# Patient Record
Sex: FEMALE | Race: OTHER | HISPANIC OR LATINO | URBAN - METROPOLITAN AREA
[De-identification: names, ages, dates, MRNs, and addresses within clinical notes are randomized per-mention and may not be internally consistent; named-entity substitution may affect disease eponyms.]

---

## 2021-11-18 ENCOUNTER — OFFICE (OUTPATIENT)
Dept: URBAN - METROPOLITAN AREA CLINIC 10 | Facility: CLINIC | Age: 41
End: 2021-11-18

## 2021-11-18 VITALS
WEIGHT: 198 LBS | DIASTOLIC BLOOD PRESSURE: 91 MMHG | SYSTOLIC BLOOD PRESSURE: 137 MMHG | HEART RATE: 104 BPM | OXYGEN SATURATION: 100 % | HEIGHT: 62 IN

## 2021-11-18 DIAGNOSIS — K60.2 ANAL FISSURE, UNSPECIFIED: ICD-10-CM

## 2021-11-18 DIAGNOSIS — K62.89 OTHER SPECIFIED DISEASES OF ANUS AND RECTUM: ICD-10-CM

## 2021-11-18 PROCEDURE — 99203 OFFICE O/P NEW LOW 30 MIN: CPT | Performed by: INTERNAL MEDICINE

## 2022-08-10 ENCOUNTER — OFFICE (OUTPATIENT)
Dept: URBAN - METROPOLITAN AREA CLINIC 19 | Facility: CLINIC | Age: 42
End: 2022-08-10

## 2022-08-10 VITALS
WEIGHT: 208 LBS | SYSTOLIC BLOOD PRESSURE: 108 MMHG | HEIGHT: 62 IN | HEART RATE: 64 BPM | DIASTOLIC BLOOD PRESSURE: 62 MMHG | OXYGEN SATURATION: 100 %

## 2022-08-10 DIAGNOSIS — R63.5 ABNORMAL WEIGHT GAIN: ICD-10-CM

## 2022-08-10 DIAGNOSIS — R53.83 OTHER FATIGUE: ICD-10-CM

## 2022-08-10 DIAGNOSIS — R11.2 NAUSEA WITH VOMITING, UNSPECIFIED: ICD-10-CM

## 2022-08-10 DIAGNOSIS — R73.03 PREDIABETES: ICD-10-CM

## 2022-08-10 DIAGNOSIS — D76.3 OTHER HISTIOCYTOSIS SYNDROMES: ICD-10-CM

## 2022-08-10 DIAGNOSIS — R10.816 EPIGASTRIC ABDOMINAL TENDERNESS: ICD-10-CM

## 2022-08-10 DIAGNOSIS — R10.10 UPPER ABDOMINAL PAIN, UNSPECIFIED: ICD-10-CM

## 2022-08-10 DIAGNOSIS — K59.00 CONSTIPATION, UNSPECIFIED: ICD-10-CM

## 2022-08-10 DIAGNOSIS — R63.1 POLYDIPSIA: ICD-10-CM

## 2022-08-10 PROCEDURE — 99214 OFFICE O/P EST MOD 30 MIN: CPT | Performed by: INTERNAL MEDICINE

## 2022-08-10 RX ORDER — OMEPRAZOLE 20 MG/1
20 CAPSULE, DELAYED RELEASE ORAL
Qty: 30 | Refills: 11 | Status: COMPLETED
Start: 2022-08-10 | End: 2022-11-10

## 2022-11-10 ENCOUNTER — OFFICE (OUTPATIENT)
Dept: URBAN - METROPOLITAN AREA CLINIC 19 | Facility: CLINIC | Age: 42
End: 2022-11-10

## 2022-11-10 VITALS
DIASTOLIC BLOOD PRESSURE: 79 MMHG | SYSTOLIC BLOOD PRESSURE: 123 MMHG | HEART RATE: 71 BPM | HEIGHT: 62 IN | WEIGHT: 218 LBS

## 2022-11-10 DIAGNOSIS — D76.3 OTHER HISTIOCYTOSIS SYNDROMES: ICD-10-CM

## 2022-11-10 DIAGNOSIS — R10.10 UPPER ABDOMINAL PAIN, UNSPECIFIED: ICD-10-CM

## 2022-11-10 DIAGNOSIS — Z22.7 LATENT TUBERCULOSIS: ICD-10-CM

## 2022-11-10 DIAGNOSIS — R10.816 EPIGASTRIC ABDOMINAL TENDERNESS: ICD-10-CM

## 2022-11-10 DIAGNOSIS — R74.01 ELEVATION OF LEVELS OF LIVER TRANSAMINASE LEVELS: ICD-10-CM

## 2022-11-10 DIAGNOSIS — K59.00 CONSTIPATION, UNSPECIFIED: ICD-10-CM

## 2022-11-10 DIAGNOSIS — R11.2 NAUSEA WITH VOMITING, UNSPECIFIED: ICD-10-CM

## 2022-11-10 LAB
ALPHA-1-ANTITRYPSIN PHENOTYP: ALPHA-1-ANTITRYPSIN, SERUM: 150 MG/DL (ref 101–187)
ALPHA-1-ANTITRYPSIN PHENOTYP: PHENOTYPE (PI): (no result)
CERULOPLASMIN: 26.4 MG/DL (ref 19–39)
HBSAG SCREEN: NEGATIVE
HEPATIC FUNCTION PANEL (7): ALBUMIN: 4.2 G/DL (ref 3.8–4.8)
HEPATIC FUNCTION PANEL (7): ALKALINE PHOSPHATASE: 108 IU/L (ref 44–121)
HEPATIC FUNCTION PANEL (7): ALT (SGPT): 47 IU/L — HIGH (ref 0–32)
HEPATIC FUNCTION PANEL (7): AST (SGOT): 66 IU/L — HIGH (ref 0–40)
HEPATIC FUNCTION PANEL (7): BILIRUBIN, DIRECT: <0.1 MG/DL
HEPATIC FUNCTION PANEL (7): BILIRUBIN, TOTAL: 0.3 MG/DL (ref 0–1.2)
HEPATIC FUNCTION PANEL (7): PROTEIN, TOTAL: 7.7 G/DL (ref 6–8.5)

## 2022-11-10 PROCEDURE — 99214 OFFICE O/P EST MOD 30 MIN: CPT | Performed by: INTERNAL MEDICINE

## 2022-11-10 RX ORDER — LANSOPRAZOLE 30 MG/1
CAPSULE, DELAYED RELEASE PELLETS ORAL
Qty: 30 | Refills: 6 | Status: COMPLETED
Start: 2022-11-10 | End: 2023-11-14

## 2022-11-10 RX ORDER — LINACLOTIDE 145 UG/1
CAPSULE, GELATIN COATED ORAL
Qty: 30 | Refills: 3 | Status: COMPLETED
Start: 2022-11-10 | End: 2023-11-14

## 2022-12-06 ENCOUNTER — AMBULATORY SURGICAL CENTER (OUTPATIENT)
Dept: URBAN - METROPOLITAN AREA SURGERY 2 | Facility: SURGERY | Age: 42
End: 2022-12-06

## 2022-12-06 ENCOUNTER — OFFICE (OUTPATIENT)
Dept: URBAN - METROPOLITAN AREA PATHOLOGY 22 | Facility: PATHOLOGY | Age: 42
End: 2022-12-06
Payer: COMMERCIAL

## 2022-12-06 VITALS
DIASTOLIC BLOOD PRESSURE: 84 MMHG | DIASTOLIC BLOOD PRESSURE: 82 MMHG | SYSTOLIC BLOOD PRESSURE: 132 MMHG | RESPIRATION RATE: 18 BRPM | SYSTOLIC BLOOD PRESSURE: 141 MMHG | DIASTOLIC BLOOD PRESSURE: 83 MMHG | WEIGHT: 219.4 LBS | RESPIRATION RATE: 16 BRPM | OXYGEN SATURATION: 94 % | DIASTOLIC BLOOD PRESSURE: 88 MMHG | HEART RATE: 74 BPM | HEART RATE: 82 BPM | HEART RATE: 88 BPM | TEMPERATURE: 98.3 F | SYSTOLIC BLOOD PRESSURE: 132 MMHG | OXYGEN SATURATION: 95 % | SYSTOLIC BLOOD PRESSURE: 122 MMHG | DIASTOLIC BLOOD PRESSURE: 83 MMHG | HEART RATE: 88 BPM | SYSTOLIC BLOOD PRESSURE: 126 MMHG | DIASTOLIC BLOOD PRESSURE: 88 MMHG | RESPIRATION RATE: 16 BRPM | HEART RATE: 96 BPM | RESPIRATION RATE: 18 BRPM | DIASTOLIC BLOOD PRESSURE: 84 MMHG | OXYGEN SATURATION: 95 % | SYSTOLIC BLOOD PRESSURE: 125 MMHG | SYSTOLIC BLOOD PRESSURE: 126 MMHG | HEIGHT: 62 IN | DIASTOLIC BLOOD PRESSURE: 87 MMHG | HEART RATE: 84 BPM | RESPIRATION RATE: 20 BRPM | HEART RATE: 74 BPM | SYSTOLIC BLOOD PRESSURE: 126 MMHG | SYSTOLIC BLOOD PRESSURE: 138 MMHG | SYSTOLIC BLOOD PRESSURE: 141 MMHG | HEIGHT: 62 IN | SYSTOLIC BLOOD PRESSURE: 132 MMHG | OXYGEN SATURATION: 92 % | HEART RATE: 74 BPM | OXYGEN SATURATION: 92 % | HEART RATE: 84 BPM | SYSTOLIC BLOOD PRESSURE: 138 MMHG | HEART RATE: 96 BPM | DIASTOLIC BLOOD PRESSURE: 83 MMHG | TEMPERATURE: 98.2 F | OXYGEN SATURATION: 92 % | TEMPERATURE: 98.2 F | WEIGHT: 219.4 LBS | RESPIRATION RATE: 20 BRPM | DIASTOLIC BLOOD PRESSURE: 87 MMHG | TEMPERATURE: 98.3 F | SYSTOLIC BLOOD PRESSURE: 125 MMHG | DIASTOLIC BLOOD PRESSURE: 79 MMHG | RESPIRATION RATE: 18 BRPM | SYSTOLIC BLOOD PRESSURE: 122 MMHG | DIASTOLIC BLOOD PRESSURE: 79 MMHG | OXYGEN SATURATION: 95 % | DIASTOLIC BLOOD PRESSURE: 79 MMHG | HEART RATE: 96 BPM | DIASTOLIC BLOOD PRESSURE: 82 MMHG | DIASTOLIC BLOOD PRESSURE: 87 MMHG | WEIGHT: 219.4 LBS | DIASTOLIC BLOOD PRESSURE: 82 MMHG | DIASTOLIC BLOOD PRESSURE: 84 MMHG | SYSTOLIC BLOOD PRESSURE: 141 MMHG | HEIGHT: 62 IN | SYSTOLIC BLOOD PRESSURE: 122 MMHG | RESPIRATION RATE: 16 BRPM | HEART RATE: 88 BPM | OXYGEN SATURATION: 94 % | TEMPERATURE: 98.3 F | OXYGEN SATURATION: 94 % | SYSTOLIC BLOOD PRESSURE: 138 MMHG | SYSTOLIC BLOOD PRESSURE: 125 MMHG | HEART RATE: 82 BPM | TEMPERATURE: 98.2 F | DIASTOLIC BLOOD PRESSURE: 88 MMHG | HEART RATE: 84 BPM | RESPIRATION RATE: 20 BRPM | HEART RATE: 82 BPM

## 2022-12-06 DIAGNOSIS — K44.9 DIAPHRAGMATIC HERNIA WITHOUT OBSTRUCTION OR GANGRENE: ICD-10-CM

## 2022-12-06 DIAGNOSIS — K29.50 UNSPECIFIED CHRONIC GASTRITIS WITHOUT BLEEDING: ICD-10-CM

## 2022-12-06 DIAGNOSIS — K20.90 ESOPHAGITIS, UNSPECIFIED WITHOUT BLEEDING: ICD-10-CM

## 2022-12-06 DIAGNOSIS — R11.2 NAUSEA WITH VOMITING, UNSPECIFIED: ICD-10-CM

## 2022-12-06 DIAGNOSIS — B37.81 CANDIDAL ESOPHAGITIS: ICD-10-CM

## 2022-12-06 DIAGNOSIS — R10.816 EPIGASTRIC ABDOMINAL TENDERNESS: ICD-10-CM

## 2022-12-06 PROBLEM — K31.89 OTHER DISEASES OF STOMACH AND DUODENUM: Status: ACTIVE | Noted: 2022-12-06

## 2022-12-06 PROCEDURE — 88342 IMHCHEM/IMCYTCHM 1ST ANTB: CPT | Performed by: STUDENT IN AN ORGANIZED HEALTH CARE EDUCATION/TRAINING PROGRAM

## 2022-12-06 PROCEDURE — 88312 SPECIAL STAINS GROUP 1: CPT | Performed by: STUDENT IN AN ORGANIZED HEALTH CARE EDUCATION/TRAINING PROGRAM

## 2022-12-06 PROCEDURE — 43239 EGD BIOPSY SINGLE/MULTIPLE: CPT | Performed by: INTERNAL MEDICINE

## 2022-12-06 PROCEDURE — 88313 SPECIAL STAINS GROUP 2: CPT | Performed by: STUDENT IN AN ORGANIZED HEALTH CARE EDUCATION/TRAINING PROGRAM

## 2022-12-06 PROCEDURE — 88305 TISSUE EXAM BY PATHOLOGIST: CPT | Performed by: STUDENT IN AN ORGANIZED HEALTH CARE EDUCATION/TRAINING PROGRAM

## 2023-11-14 ENCOUNTER — OFFICE (OUTPATIENT)
Dept: URBAN - METROPOLITAN AREA CLINIC 19 | Facility: CLINIC | Age: 43
End: 2023-11-14
Payer: COMMERCIAL

## 2023-11-14 VITALS
HEART RATE: 71 BPM | SYSTOLIC BLOOD PRESSURE: 117 MMHG | OXYGEN SATURATION: 97 % | HEIGHT: 62 IN | DIASTOLIC BLOOD PRESSURE: 77 MMHG | WEIGHT: 229 LBS

## 2023-11-14 DIAGNOSIS — R10.816 EPIGASTRIC ABDOMINAL TENDERNESS: ICD-10-CM

## 2023-11-14 DIAGNOSIS — R11.2 NAUSEA WITH VOMITING, UNSPECIFIED: ICD-10-CM

## 2023-11-14 DIAGNOSIS — D76.3 OTHER HISTIOCYTOSIS SYNDROMES: ICD-10-CM

## 2023-11-14 DIAGNOSIS — R63.5 ABNORMAL WEIGHT GAIN: ICD-10-CM

## 2023-11-14 DIAGNOSIS — R74.01 ELEVATION OF LEVELS OF LIVER TRANSAMINASE LEVELS: ICD-10-CM

## 2023-11-14 DIAGNOSIS — Z22.7 LATENT TUBERCULOSIS: ICD-10-CM

## 2023-11-14 PROCEDURE — 99214 OFFICE O/P EST MOD 30 MIN: CPT

## 2023-11-14 RX ORDER — METOCLOPRAMIDE 5 MG/1
TABLET ORAL
Qty: 90 | Refills: 0 | Status: ACTIVE
Start: 2023-11-14

## 2023-11-14 RX ORDER — OMEPRAZOLE 40 MG/1
CAPSULE, DELAYED RELEASE ORAL
Qty: 180 | Refills: 1 | Status: ACTIVE
Start: 2023-11-14

## 2023-11-14 NOTE — SERVICEHPINOTES
43-year-old female here for a follow-up visit.  Continues to report discomfort in the epigastric region as well as nausea which limits her ability to eat. Appears to have gained 11 pounds since her last clinic visit in November 2022. Spoke to patient's daughter and advised her to get in touch with the health department regarding starting treatment for latent TB. Not taking any PPI therapy.  Quit taking Linzess and is complaining of constipation as well as a sense of incomplete evacuation. She does have history of pre diabetes.  She does have some skin lesions and reports that biopsies revealed evidence of reticulohistiocytoma.  She was prescribed methotrexate but could not tolerate this.  No family history of colon cancer or colon polyps in first-degree relatives.  No cardiac issues and not on any blood thinners or diet pills.  She is post cholecystectomy.  Outside labs from September 2023 revealed , ALT 70, alkaline phosphatase 80. A1c 5.4. Takes Ozempic. Her family member served as the  during the visit per patient request.   Informed me that she is seeing Dr. Girard regarding the reticulohistiocytoma and is scheduled for a PET scan tomorrow at [facility].  Also informed me that she is being referred to another GI doctor and is planning to undergo a colonoscopy at [facility].carina lim Evaluated in November 2022 for nausea and vomiting reveals elevated liver enzymes.  Labs at that time revealed AST 66 and ALT 47.  Alpha 1 antitrypsin phenotype mm.  Normal ceruloplasmin. Hepatitis-B surface antigen negative.  Normal gastric emptying test.  EGD in December 2022 revealed a few white plaques in the middle esophagus, small hiatal hernia, erythema in the antrum/stomach body, and normal mucosa in the 1st and 2nd part of the duodenum.  Biopsies from the esophagus revealed Candida esophagitis and negative for H pylori.  Patient was treated with a course of Diflucan. carina lim Evaluated in August 2022 for abdominal pain, distention and fatigue.  Celiac serology was negative.  AST was 56, ALT 53.  Rest of the liver enzymes were normal.  CBC was normal.  Alpha gal IgE antibody was negative.  Iron saturation was 21.  B12 553 and folate 18.  TSH and vitamin-D were normal.  Amylase and lipase were normal.  CRP was normal.  Ferritin was 14.  Food allergy panel was negative.  DEMETRIO was elevated in 1:80 titer.  Hemoglobin A1c was 6.2.  Ultrasound in August 2022 revealed hepatic steatosis, prior cholecystectomy status.  There was no biliary dilation.  CT abdomen and pelvis with contrast revealed normal liver, pancreas.  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  Labs done at their office revealed AST 82, ALT 69.  Hep B core IgM antibody was negative.  Hep C antibody was negative.  QuantiFERON was positive.

## 2023-11-14 NOTE — SERVICENOTES
MD Addendum: I, Solange Frias MD, independently interviewed and examined this patient and made modifications to the final HPI, exam, impression, and plan as initially scribed by Irais Franks NP.

## 2023-11-17 LAB
ALPHA-GAL IGE PANEL: CLASS DESCRIPTION: (no result)
ALPHA-GAL IGE PANEL: F026-IGE PORK: <0.1 KU/L
ALPHA-GAL IGE PANEL: F027-IGE BEEF: <0.1 KU/L
ALPHA-GAL IGE PANEL: F088-IGE LAMB: <0.1 KU/L
ALPHA-GAL IGE PANEL: IMMUNOGLOBULIN E, TOTAL: 152 IU/ML (ref 6–495)
ALPHA-GAL IGE PANEL: O215-IGE ALPHA-GAL: <0.1 KU/L
CELIAC DISEASE COMPREHENSIVE: DEAMIDATED GLIADIN ABS, IGA: 4 UNITS (ref 0–19)
CELIAC DISEASE COMPREHENSIVE: DEAMIDATED GLIADIN ABS, IGG: 3 UNITS (ref 0–19)
CELIAC DISEASE COMPREHENSIVE: ENDOMYSIAL ANTIBODY IGA: NEGATIVE
CELIAC DISEASE COMPREHENSIVE: IMMUNOGLOBULIN A, QN, SERUM: 281 MG/DL (ref 87–352)
CELIAC DISEASE COMPREHENSIVE: T-TRANSGLUTAMINASE (TTG) IGA: <2 U/ML
CELIAC DISEASE COMPREHENSIVE: T-TRANSGLUTAMINASE (TTG) IGG: 2 U/ML (ref 0–5)
FOOD ALLERGY PROFILE: F001-IGE EGG WHITE: <0.1 KU/L
FOOD ALLERGY PROFILE: F002-IGE MILK: <0.1 KU/L
FOOD ALLERGY PROFILE: F003-IGE CODFISH: <0.1 KU/L
FOOD ALLERGY PROFILE: F004-IGE WHEAT: <0.1 KU/L
FOOD ALLERGY PROFILE: F008-IGE CORN: <0.1 KU/L
FOOD ALLERGY PROFILE: F010-IGE SESAME SEED: <0.1 KU/L
FOOD ALLERGY PROFILE: F013-IGE PEANUT: <0.1 KU/L
FOOD ALLERGY PROFILE: F014-IGE SOYBEAN: <0.1 KU/L
FOOD ALLERGY PROFILE: F024-IGE SHRIMP: <0.1 KU/L
FOOD ALLERGY PROFILE: F207-IGE CLAM: <0.1 KU/L
FOOD ALLERGY PROFILE: F256-IGE WALNUT: <0.1 KU/L
FOOD ALLERGY PROFILE: F338-IGE SCALLOP: <0.1 KU/L

## 2024-01-23 ENCOUNTER — OFFICE (OUTPATIENT)
Dept: URBAN - METROPOLITAN AREA CLINIC 11 | Facility: CLINIC | Age: 44
End: 2024-01-23
Payer: COMMERCIAL

## 2024-01-23 VITALS — HEIGHT: 62 IN

## 2024-01-23 DIAGNOSIS — K76.0 FATTY (CHANGE OF) LIVER, NOT ELSEWHERE CLASSIFIED: ICD-10-CM

## 2024-01-23 PROCEDURE — 76981 USE PARENCHYMA: CPT | Performed by: INTERNAL MEDICINE

## 2025-03-28 ENCOUNTER — OFFICE (OUTPATIENT)
Dept: URBAN - METROPOLITAN AREA CLINIC 10 | Facility: CLINIC | Age: 45
End: 2025-03-28

## 2025-03-28 VITALS
DIASTOLIC BLOOD PRESSURE: 74 MMHG | HEART RATE: 85 BPM | SYSTOLIC BLOOD PRESSURE: 113 MMHG | WEIGHT: 211 LBS | HEIGHT: 62 IN | OXYGEN SATURATION: 97 %

## 2025-03-28 DIAGNOSIS — R10.816 EPIGASTRIC ABDOMINAL TENDERNESS: ICD-10-CM

## 2025-03-28 DIAGNOSIS — Z22.7 LATENT TUBERCULOSIS: ICD-10-CM

## 2025-03-28 DIAGNOSIS — K21.9 GASTRO-ESOPHAGEAL REFLUX DISEASE WITHOUT ESOPHAGITIS: ICD-10-CM

## 2025-03-28 DIAGNOSIS — R63.0 ANOREXIA: ICD-10-CM

## 2025-03-28 DIAGNOSIS — K74.00 HEPATIC FIBROSIS, UNSPECIFIED: ICD-10-CM

## 2025-03-28 PROCEDURE — 99214 OFFICE O/P EST MOD 30 MIN: CPT

## 2025-03-28 RX ORDER — ONDANSETRON 4 MG/1
TABLET, ORALLY DISINTEGRATING ORAL
Qty: 20 | Refills: 0 | Status: ACTIVE
Start: 2025-03-28

## 2025-03-28 RX ORDER — ESOMEPRAZOLE MAGNESIUM 40 MG/1
CAPSULE, DELAYED RELEASE PELLETS ORAL
Qty: 90 | Refills: 3 | Status: ACTIVE
Start: 2025-03-28

## 2025-03-31 LAB
CBC WITH DIFFERENTIAL/PLATELET: BASO (ABSOLUTE): 0.1 X10E3/UL (ref 0–0.2)
CBC WITH DIFFERENTIAL/PLATELET: BASOS: 1 %
CBC WITH DIFFERENTIAL/PLATELET: EOS (ABSOLUTE): 0.6 X10E3/UL — HIGH (ref 0–0.4)
CBC WITH DIFFERENTIAL/PLATELET: EOS: 8 %
CBC WITH DIFFERENTIAL/PLATELET: HEMATOCRIT: 47.4 % — HIGH (ref 34–46.6)
CBC WITH DIFFERENTIAL/PLATELET: HEMOGLOBIN: 15.7 G/DL (ref 11.1–15.9)
CBC WITH DIFFERENTIAL/PLATELET: IMMATURE GRANS (ABS): 0 X10E3/UL (ref 0–0.1)
CBC WITH DIFFERENTIAL/PLATELET: IMMATURE GRANULOCYTES: 0 %
CBC WITH DIFFERENTIAL/PLATELET: LYMPHS (ABSOLUTE): 3.3 X10E3/UL — HIGH (ref 0.7–3.1)
CBC WITH DIFFERENTIAL/PLATELET: LYMPHS: 39 %
CBC WITH DIFFERENTIAL/PLATELET: MCH: 29.1 PG (ref 26.6–33)
CBC WITH DIFFERENTIAL/PLATELET: MCHC: 33.1 G/DL (ref 31.5–35.7)
CBC WITH DIFFERENTIAL/PLATELET: MCV: 88 FL (ref 79–97)
CBC WITH DIFFERENTIAL/PLATELET: MONOCYTES(ABSOLUTE): 0.6 X10E3/UL (ref 0.1–0.9)
CBC WITH DIFFERENTIAL/PLATELET: MONOCYTES: 7 %
CBC WITH DIFFERENTIAL/PLATELET: NEUTROPHILS (ABSOLUTE): 3.9 X10E3/UL (ref 1.4–7)
CBC WITH DIFFERENTIAL/PLATELET: NEUTROPHILS: 45 %
CBC WITH DIFFERENTIAL/PLATELET: PLATELETS: 272 X10E3/UL (ref 150–450)
CBC WITH DIFFERENTIAL/PLATELET: RBC: 5.4 X10E6/UL — HIGH (ref 3.77–5.28)
CBC WITH DIFFERENTIAL/PLATELET: RDW: 14.4 % (ref 11.7–15.4)
CBC WITH DIFFERENTIAL/PLATELET: WBC: 8.5 X10E3/UL (ref 3.4–10.8)
COMP. METABOLIC PANEL (14): ALBUMIN: 4.9 G/DL (ref 3.9–4.9)
COMP. METABOLIC PANEL (14): ALKALINE PHOSPHATASE: 73 IU/L (ref 44–121)
COMP. METABOLIC PANEL (14): ALT (SGPT): 34 IU/L — HIGH (ref 0–32)
COMP. METABOLIC PANEL (14): AST (SGOT): 39 IU/L (ref 0–40)
COMP. METABOLIC PANEL (14): BILIRUBIN, TOTAL: 0.8 MG/DL (ref 0–1.2)
COMP. METABOLIC PANEL (14): BUN/CREATININE RATIO: 7 — LOW (ref 9–23)
COMP. METABOLIC PANEL (14): BUN: 6 MG/DL (ref 6–24)
COMP. METABOLIC PANEL (14): CALCIUM: 10.3 MG/DL — HIGH (ref 8.7–10.2)
COMP. METABOLIC PANEL (14): CARBON DIOXIDE, TOTAL: 23 MMOL/L (ref 20–29)
COMP. METABOLIC PANEL (14): CHLORIDE: 102 MMOL/L (ref 96–106)
COMP. METABOLIC PANEL (14): CREATININE: 0.87 MG/DL (ref 0.57–1)
COMP. METABOLIC PANEL (14): EGFR: 85 ML/MIN/1.73 (ref 59–?)
COMP. METABOLIC PANEL (14): GLOBULIN, TOTAL: 2.9 G/DL (ref 1.5–4.5)
COMP. METABOLIC PANEL (14): GLUCOSE: 87 MG/DL (ref 70–99)
COMP. METABOLIC PANEL (14): POTASSIUM: 4.7 MMOL/L (ref 3.5–5.2)
COMP. METABOLIC PANEL (14): PROTEIN, TOTAL: 7.8 G/DL (ref 6–8.5)
COMP. METABOLIC PANEL (14): SODIUM: 140 MMOL/L (ref 134–144)
FE+TIBC+FER: FERRITIN: 31 NG/ML (ref 15–150)
FE+TIBC+FER: IRON BIND.CAP.(TIBC): 389 UG/DL (ref 250–450)
FE+TIBC+FER: IRON SATURATION: 21 % (ref 15–55)
FE+TIBC+FER: IRON: 83 UG/DL (ref 27–159)
FE+TIBC+FER: UIBC: 306 UG/DL (ref 131–425)
QUANTIFERON-TB GOLD PLUS: POSITIVE
QUANTIFERON-TB GOLD PLUS: QUANTIFERON CRITERIA: (no result)
QUANTIFERON-TB GOLD PLUS: QUANTIFERON INCUBATION: (no result)
QUANTIFERON-TB GOLD PLUS: QUANTIFERON MITOGEN VALUE: >10 IU/ML
QUANTIFERON-TB GOLD PLUS: QUANTIFERON NIL VALUE: 0.05 IU/ML
QUANTIFERON-TB GOLD PLUS: QUANTIFERON TB1 AG VALUE: 1.12 IU/ML
QUANTIFERON-TB GOLD PLUS: QUANTIFERON TB2 AG VALUE: 1.8 IU/ML